# Patient Record
Sex: MALE | Race: OTHER | HISPANIC OR LATINO | URBAN - METROPOLITAN AREA
[De-identification: names, ages, dates, MRNs, and addresses within clinical notes are randomized per-mention and may not be internally consistent; named-entity substitution may affect disease eponyms.]

---

## 2018-09-25 ENCOUNTER — EMERGENCY (EMERGENCY)
Facility: HOSPITAL | Age: 33
LOS: 1 days | Discharge: HOME | End: 2018-09-25
Attending: EMERGENCY MEDICINE
Payer: SELF-PAY

## 2018-09-25 VITALS
SYSTOLIC BLOOD PRESSURE: 134 MMHG | HEART RATE: 94 BPM | TEMPERATURE: 98 F | RESPIRATION RATE: 17 BRPM | DIASTOLIC BLOOD PRESSURE: 93 MMHG | OXYGEN SATURATION: 98 %

## 2018-09-25 VITALS — WEIGHT: 115.08 LBS | HEIGHT: 63 IN

## 2018-09-25 PROCEDURE — 99283 EMERGENCY DEPT VISIT LOW MDM: CPT | Mod: 25

## 2018-09-25 PROCEDURE — 99053 MED SERV 10PM-8AM 24 HR FAC: CPT

## 2018-09-25 PROCEDURE — 42700 I&D ABSCESS PERITONSILLAR: CPT | Mod: 54

## 2018-09-25 PROCEDURE — 42700 I&D ABSCESS PERITONSILLAR: CPT | Mod: LT

## 2018-09-25 RX ADMIN — Medication 1 TABLET(S): at 23:33

## 2018-09-25 NOTE — ED PROVIDER NOTE - ATTENDING CONTRIBUTION TO CARE
I, Javan Tang, performed the initial face to face bedside interview with this patient regarding history of present illness, review of symptoms and relevant past medical, social and family history.  I completed an independent physical examination.  I was the initial provider who evaluated this patient.  The history, relevant review of systems, past medical and surgical history, medical decision making, and physical examination was documented by the scribe in my presence and I attest to the accuracy of the documentation.  I have signed out the follow up of any pending tests (i.e. labs, radiological studies) to the ACP.  I have communicated the patient’s plan of care and disposition with the ACP.

## 2018-09-25 NOTE — ED PROVIDER NOTE - OBJECTIVE STATEMENT
A 33 year old male pt presents to the ED c/o Sore throat. For the past 3 days the pt has been having worsening left sided throat pain and difficulty swallowing. The pt states that today he began to experience drooling and difficulty swallowing. Pt denies any fevers, SOB, difficulty breathing. No further complaints at this time.

## 2020-01-18 NOTE — ED PROVIDER NOTE - NS ED ATTENDING STATEMENT MOD
I have personally performed a face to face diagnostic evaluation on this patient. I have reviewed the ACP note and agree with the history, exam and plan of care, except as noted. (2) well flexed